# Patient Record
Sex: MALE | Race: WHITE | Employment: OTHER | URBAN - METROPOLITAN AREA
[De-identification: names, ages, dates, MRNs, and addresses within clinical notes are randomized per-mention and may not be internally consistent; named-entity substitution may affect disease eponyms.]

---

## 2020-08-12 ENCOUNTER — HOSPITAL ENCOUNTER (EMERGENCY)
Age: 80
Discharge: HOME | End: 2020-08-12
Attending: EMERGENCY MEDICINE
Payer: MEDICARE

## 2020-08-12 ENCOUNTER — APPOINTMENT (OUTPATIENT)
Dept: GENERAL RADIOLOGY | Age: 80
End: 2020-08-12
Payer: MEDICARE

## 2020-08-12 VITALS
DIASTOLIC BLOOD PRESSURE: 77 MMHG | TEMPERATURE: 98.2 F | WEIGHT: 187 LBS | HEIGHT: 70 IN | BODY MASS INDEX: 26.77 KG/M2 | OXYGEN SATURATION: 95 % | HEART RATE: 58 BPM | SYSTOLIC BLOOD PRESSURE: 165 MMHG | RESPIRATION RATE: 18 BRPM

## 2020-08-12 LAB
ANION GAP SERPL CALCULATED.3IONS-SCNC: 13 MMOL/L (ref 9–17)
BNP INTERPRETATION: ABNORMAL
BUN BLDV-MCNC: 14 MG/DL (ref 8–23)
BUN/CREAT BLD: ABNORMAL (ref 9–20)
CALCIUM SERPL-MCNC: 9.6 MG/DL (ref 8.6–10.4)
CHLORIDE BLD-SCNC: 100 MMOL/L (ref 98–107)
CO2: 23 MMOL/L (ref 20–31)
CREAT SERPL-MCNC: 0.85 MG/DL (ref 0.7–1.2)
GFR AFRICAN AMERICAN: >60 ML/MIN
GFR NON-AFRICAN AMERICAN: >60 ML/MIN
GFR SERPL CREATININE-BSD FRML MDRD: ABNORMAL ML/MIN/{1.73_M2}
GFR SERPL CREATININE-BSD FRML MDRD: ABNORMAL ML/MIN/{1.73_M2}
GLUCOSE BLD-MCNC: 147 MG/DL (ref 70–99)
HCT VFR BLD CALC: 32.2 % (ref 40.7–50.3)
HEMOGLOBIN: 9.7 G/DL (ref 13–17)
MAGNESIUM: 2.1 MG/DL (ref 1.6–2.6)
MCH RBC QN AUTO: 26.6 PG (ref 25.2–33.5)
MCHC RBC AUTO-ENTMCNC: 30.1 G/DL (ref 28.4–34.8)
MCV RBC AUTO: 88.5 FL (ref 82.6–102.9)
NRBC AUTOMATED: 0 PER 100 WBC
PDW BLD-RTO: 17.7 % (ref 11.8–14.4)
PLATELET # BLD: 209 K/UL (ref 138–453)
PMV BLD AUTO: 10.8 FL (ref 8.1–13.5)
POTASSIUM SERPL-SCNC: 4 MMOL/L (ref 3.7–5.3)
PRO-BNP: 3044 PG/ML
RBC # BLD: 3.64 M/UL (ref 4.21–5.77)
SARS-COV-2, PCR: NORMAL
SARS-COV-2, RAPID: NOT DETECTED
SARS-COV-2: NORMAL
SODIUM BLD-SCNC: 136 MMOL/L (ref 135–144)
SOURCE: NORMAL
TROPONIN INTERP: ABNORMAL
TROPONIN INTERP: ABNORMAL
TROPONIN T: ABNORMAL NG/ML
TROPONIN T: ABNORMAL NG/ML
TROPONIN, HIGH SENSITIVITY: 30 NG/L (ref 0–22)
TROPONIN, HIGH SENSITIVITY: 31 NG/L (ref 0–22)
WBC # BLD: 6.3 K/UL (ref 3.5–11.3)

## 2020-08-12 PROCEDURE — 85027 COMPLETE CBC AUTOMATED: CPT

## 2020-08-12 PROCEDURE — 83880 ASSAY OF NATRIURETIC PEPTIDE: CPT

## 2020-08-12 PROCEDURE — 93005 ELECTROCARDIOGRAM TRACING: CPT | Performed by: EMERGENCY MEDICINE

## 2020-08-12 PROCEDURE — 80048 BASIC METABOLIC PNL TOTAL CA: CPT

## 2020-08-12 PROCEDURE — 84484 ASSAY OF TROPONIN QUANT: CPT

## 2020-08-12 PROCEDURE — U0002 COVID-19 LAB TEST NON-CDC: HCPCS

## 2020-08-12 PROCEDURE — 71045 X-RAY EXAM CHEST 1 VIEW: CPT

## 2020-08-12 PROCEDURE — 99284 EMERGENCY DEPT VISIT MOD MDM: CPT

## 2020-08-12 PROCEDURE — 83735 ASSAY OF MAGNESIUM: CPT

## 2020-08-12 ASSESSMENT — ENCOUNTER SYMPTOMS
RHINORRHEA: 0
CONSTIPATION: 0
DIARRHEA: 0
WHEEZING: 0
SORE THROAT: 0
COUGH: 0
VOMITING: 0
ABDOMINAL DISTENTION: 0
NAUSEA: 0
SHORTNESS OF BREATH: 0

## 2020-08-12 NOTE — ED PROVIDER NOTES
[facility]  Emergency Department  Emergency Medicine Resident Sign-out     Care of Brianna Cochran was assumed from Dr. Sarika Lay and is being seen for Loss of Consciousness (syncopal episode at home, assited to ground, out for 20 seconds per family, )  . The patient's initial evaluation and plan have been discussed with the prior provider who initially evaluated the patient. EMERGENCY DEPARTMENT COURSE / MEDICAL DECISION MAKING:       MEDICATIONS GIVEN:  No orders of the defined types were placed in this encounter. LABS / RADIOLOGY:     Results for orders placed or performed during the hospital encounter of 08/12/20   CBC   Result Value Ref Range    WBC 6.3 3.5 - 11.3 k/uL    RBC 3.64 (L) 4.21 - 5.77 m/uL    Hemoglobin 9.7 (L) 13.0 - 17.0 g/dL    Hematocrit 32.2 (L) 40.7 - 50.3 %    MCV 88.5 82.6 - 102.9 fL    MCH 26.6 25.2 - 33.5 pg    MCHC 30.1 28.4 - 34.8 g/dL    RDW 17.7 (H) 11.8 - 14.4 %    Platelets 891 572 - 387 k/uL    MPV 10.8 8.1 - 13.5 fL    NRBC Automated 0.0 0.0 per 100 WBC   BASIC METABOLIC PANEL   Result Value Ref Range    Glucose 147 (H) 70 - 99 mg/dL    BUN 14 8 - 23 mg/dL    CREATININE 0.85 0.70 - 1.20 mg/dL    Bun/Cre Ratio NOT REPORTED 9 - 20    Calcium 9.6 8.6 - 10.4 mg/dL    Sodium 136 135 - 144 mmol/L    Potassium 4.0 3.7 - 5.3 mmol/L    Chloride 100 98 - 107 mmol/L    CO2 23 20 - 31 mmol/L    Anion Gap 13 9 - 17 mmol/L    GFR Non-African American >60 >60 mL/min    GFR African American >60 >60 mL/min    GFR Comment          GFR Staging NOT REPORTED    Troponin   Result Value Ref Range    Troponin, High Sensitivity 30 (H) 0 - 22 ng/L    Troponin T NOT REPORTED <0.03 ng/mL    Troponin Interp NOT REPORTED    MAGNESIUM   Result Value Ref Range    Magnesium 2.1 1.6 - 2.6 mg/dL   COVID-19    Specimen: Other   Result Value Ref Range    SARS-CoV-2          SARS-CoV-2, Rapid Not Detected Not Detected    Source . NASOPHARYNGEAL SWAB     SARS-CoV-2, PCR         Troponin

## 2020-08-12 NOTE — ED NOTES
Pt came into ER in [facility], pt reports syncopal episode today at home and per family he was assisted to the ground, family reports this lasted 20 seconds, pt reports he felt weak while playing gold yesterday out in the sun, pt denies chest pain, pt denies any pain, pt on monitor, in NAD, will continue to assess      Sae Holden RN  08/12/20 [id number]

## 2020-08-12 NOTE — PROGRESS NOTES
RAPID Covid 19 swab taken from right nare, labeled, placed in red dot bag, and handed off to second healthcare worker outside of room for transport to laboratory per hospital policy and procedure. Patient tolerated procedure well.       Swab placed at coordinators desk for transport to Lab

## 2020-08-12 NOTE — ED PROVIDER NOTES
[facility] ED  Emergency Department        Pt Name: Dasha Tellez  MRN: [id number]  [place] 1940  Date of evaluation: 8/12/20    CHIEF COMPLAINT       Chief Complaint   Patient presents with    Loss of Consciousness     syncopal episode at home, assited to ground, out for 20 seconds per family,        HISTORY OF PRESENT ILLNESS  (Location/Symptom, Timing/Onset, Context/Setting, Quality, Duration, ModifyingFactors, Severity.)      Dasha Tellez is a 78 y.o. male who presents with syncopal episode while at work. Patient was lowered to the ground he did feel dizzy prior to the onset of passing out. No shaking episode no loss of bowel or bladder incontinence. He does have significant history the cardiac disease including CABG in March 2019 he has had multiple cardioversions from a history of A. fib but has not had A. fib since his CABG. He follows with cardiology at [facility]. He is not on any anticoagulation as he reports he is \"genetically sensitive\" he denies any cough or congestion. Does have chronic shortness of breath which is unchanged. No recent fevers no leg swelling no abdominal pain no nausea or vomiting. He did get diaphoretic and was pale per witnesses who assisted him to the ground. No postical state. Lu Pelayo feels he is just dehydrated, after being in sun yesterday    Patient also reports a h/o of pleural effusions, requiring thoracentesis, know recurrence of effusions, Follows with pulm at [facility]. Just had imaging a few days ago, and has follow up with CT surgery next week. PAST MEDICAL / SURGICAL / SOCIAL / FAMILY HISTORY      has a past medical history of A-fib ([address] History of cardioversion, Hypertension, and Rectus sheath hematoma. has no past surgical history on file.        Social History     Socioeconomic History    Marital status:      Spouse name: Not on file    Number of children: Not on file    Years of education: Not on file    Highest education level: Not on file   Occupational History    Not on file   Social Needs    Financial resource strain: Not on file    Food insecurity     Worry: Not on file     Inability: Not on file    Transportation needs     Medical: Not on file     Non-medical: Not on file   Tobacco Use    Smoking status: Never Smoker    Smokeless tobacco: Never Used   Substance and Sexual Activity    Alcohol use: Yes     Comment: social    Drug use: No    Sexual activity: Not on file   Lifestyle    Physical activity     Days per week: Not on file     Minutes per session: Not on file    Stress: Not on file   Relationships    Social connections     Talks on phone: Not on file     Gets together: Not on file     Attends Latter-day service: Not on file     Active member of club or organization: Not on file     Attends meetings of clubs or organizations: Not on file     Relationship status: Not on file    Intimate partner violence     Fear of current or ex partner: Not on file     Emotionally abused: Not on file     Physically abused: Not on file     Forced sexual activity: Not on file   Other Topics Concern    Not on file   Social History Narrative    Not on file       History reviewed. No pertinent family history. Allergies:  Coumadin [warfarin sodium]; Effient [prasugrel]; Lovenox [enoxaparin sodium]; and Plavix [clopidogrel bisulfate]    Home Medications:  Prior to Admission medications    Medication Sig Start Date End Date Taking?  Authorizing Provider   oxyCODONE-acetaminophen (PERCOCET) 5-325 MG per tablet Take 1-2 tablets by mouth every 4 hours as needed for Pain 4/14/16   [place] MD   indomethacin (INDOCIN) 50 MG capsule Take 1 capsule by mouth 3 times daily (with meals) 4/14/16   [place] MD   allopurinol (ZYLOPRIM) 100 MG tablet Take 200 mg by mouth daily    Historical Provider, MD   ibuprofen (ADVIL;MOTRIN) 800 MG tablet Take 1 tablet by mouth every 8 hours as needed Physical Exam  Vitals signs and nursing note reviewed. Constitutional:       General: He is not in acute distress. Appearance: He is well-developed. Comments: Non toxic appearing, speaking in full sentences without signs of distress   HENT:      Head: Normocephalic and atraumatic. Eyes:      General:         Right eye: No discharge. Left eye: No discharge. Conjunctiva/sclera: Conjunctivae normal.   Cardiovascular:      Rate and Rhythm: Normal rate and regular rhythm. Heart sounds: Normal heart sounds. No murmur. No friction rub. No gallop. Pulmonary:      Effort: Pulmonary effort is normal. No respiratory distress. Breath sounds: Normal breath sounds. No wheezing or rales. Chest:      Chest wall: No tenderness. Abdominal:      General: There is no distension. Palpations: Abdomen is soft. There is no mass. Tenderness: There is no abdominal tenderness. There is no guarding or rebound. Musculoskeletal:      Right lower leg: No edema. Left lower leg: No edema. Skin:     General: Skin is warm and dry. Findings: No rash. Neurological:      Mental Status: He is alert and oriented to person, place, and time. DIFFERENTIAL  DIAGNOSIS     Patient with history of CABG, pleural effusions. Felt dizzy today had a syncopal episode. No prodromal headache no prodromal chest pain or shortness of breath. Patient does have a history of A. fib he is not anticoagulated. EKG shows bradycardia with frequent PVCs unclear if this was cardiac event versus what patient feels is dehydration. Patient does not appear overtly dehydrated. He has wet mucous membranes.   We will plan on electrolytes cardiac work-up with BNP as well as chest x-ray troponins x2 EKG we will keep patient on telemetry    PLAN (LABS / IMAGING / EKG):  Orders Placed This Encounter   Procedures    XR CHEST PORTABLE    CBC    BASIC METABOLIC PANEL    Troponin    MAGNESIUM    Urinalysis with microscopic    COVID-19    Troponin    Brain Natriuretic Peptide    Telemetry monitoring    EKG 12 Lead    Insert peripheral IV       MEDICATIONS ORDERED:  No orders of the defined types were placed in this encounter. DIAGNOSTIC RESULTS / EMERGENCY DEPARTMENT COURSE / MDM     LABS:  Results for orders placed or performed during the hospital encounter of 08/12/20   CBC   Result Value Ref Range    WBC 6.3 3.5 - 11.3 k/uL    RBC 3.64 (L) 4.21 - 5.77 m/uL    Hemoglobin 9.7 (L) 13.0 - 17.0 g/dL    Hematocrit 32.2 (L) 40.7 - 50.3 %    MCV 88.5 82.6 - 102.9 fL    MCH 26.6 25.2 - 33.5 pg    MCHC 30.1 28.4 - 34.8 g/dL    RDW 17.7 (H) 11.8 - 14.4 %    Platelets 904 281 - 641 k/uL    MPV 10.8 8.1 - 13.5 fL    NRBC Automated 0.0 0.0 per 100 WBC   BASIC METABOLIC PANEL   Result Value Ref Range    Glucose 147 (H) 70 - 99 mg/dL    BUN 14 8 - 23 mg/dL    CREATININE 0.85 0.70 - 1.20 mg/dL    Bun/Cre Ratio NOT REPORTED 9 - 20    Calcium 9.6 8.6 - 10.4 mg/dL    Sodium 136 135 - 144 mmol/L    Potassium 4.0 3.7 - 5.3 mmol/L    Chloride 100 98 - 107 mmol/L    CO2 23 20 - 31 mmol/L    Anion Gap 13 9 - 17 mmol/L    GFR Non-African American >60 >60 mL/min    GFR African American >60 >60 mL/min    GFR Comment          GFR Staging NOT REPORTED    Troponin   Result Value Ref Range    Troponin, High Sensitivity 30 (H) 0 - 22 ng/L    Troponin T NOT REPORTED <0.03 ng/mL    Troponin Interp NOT REPORTED    MAGNESIUM   Result Value Ref Range    Magnesium 2.1 1.6 - 2.6 mg/dL   COVID-19    Specimen: Other   Result Value Ref Range    SARS-CoV-2          SARS-CoV-2, Rapid Not Detected Not Detected    Source . NASOPHARYNGEAL SWAB     SARS-CoV-2, PCR         Troponin   Result Value Ref Range    Troponin, High Sensitivity 31 (H) 0 - 22 ng/L    Troponin T NOT REPORTED <0.03 ng/mL    Troponin Interp NOT REPORTED        IMPRESSION: Syncope, bradycardia    RADIOLOGY:  XR CHEST PORTABLE   Final Result   Bilateral pleural effusions with bibasilar pulmonary opacities that could   represent atelectasis or pneumonia               EKG:  Patient shows EKG that shows sinus bradycardia with frequent PVCs. No ST elevations or depressions noted. EMERGENCY DEPARTMENT COURSE:  1:11 PM EDT  Spoke with patient and wife, due to concern for cardiac etiology causing syncope. And recommended admission, given patient h/o of all medical follow up at [address] [place] [address] did offer transfer v admission at [facility], patient and wife discussed and would like to be transferred. 2:46 PM EDT  Initially spoke with Dr. Fiona Ramirez at [facility] [employer] [facility] who was going to speak with cardiology before accepting the patient. FINAL IMPRESSION      1. Syncope and collapse    2. Dizziness          DISPOSITION / PLAN     DISPOSITION Decision To Transfer 08/12/2020 01:11:44 PM      PATIENT REFERRED TO:  No follow-up provider specified.     DISCHARGE MEDICATIONS:  New Prescriptions    No medications on file       [place]  1:12 PM    Attending Emergency Physician  [address] [facility]    (Please note that portions of this note were completed with a voice recognition program.  Shalonda Li made to edit the dictations but occasionally words are mis-transcribed.)              Ulysses Hernandez DO  08/12/20 1447

## 2020-08-12 NOTE — ED NOTES
Writer sent blood work to lab   Pt resting in bed in NAD will continue to assess      Lico Tucker RN  08/12/20 7314

## 2020-08-12 NOTE — ED PROVIDER NOTES
[address] HANDOFF       Handoff taken on the following patient from prior Attending Physician:  Pt Name: Elisha Lisa  PCP:  Irina Bonner MD    Attestation  I was available and discussed any additional care issues that arose and coordinated the management plans with the resident(s) caring for the patient during my duty period. Any areas of disagreement with resident's documentation of care or procedures are noted on the chart. I was personally present for the key portions of any/all procedures during my duty period. I have documented in the chart those procedures where I was not present during the key portions. CHIEF COMPLAINT       Chief Complaint   Patient presents with    Loss of Consciousness     syncopal episode at home, assited to ground, out for 20 seconds per family,          CURRENT MEDICATIONS     Previous Medications  Discharge Medication List as of 8/12/2020  4:48 PM      CONTINUE these medications which have NOT CHANGED    Details   oxyCODONE-acetaminophen (PERCOCET) 5-325 MG per tablet Take 1-2 tablets by mouth every 4 hours as needed for Pain, Disp-30 tablet, R-0      !! indomethacin (INDOCIN) 50 MG capsule Take 1 capsule by mouth 3 times daily (with meals), Disp-21 capsule, R-0      allopurinol (ZYLOPRIM) 100 MG tablet Take 200 mg by mouth daily      ibuprofen (ADVIL;MOTRIN) 800 MG tablet Take 1 tablet by mouth every 8 hours as needed for Pain, Disp-20 tablet, R-0      acetaminophen-codeine (TYLENOL/CODEINE #3) 300-30 MG per tablet Take 1 tablet by mouth every 6 hours as needed for Pain, Disp-20 tablet, R-0      !! indomethacin (INDOCIN) 50 MG capsule Take 1 capsule by mouth 2 times daily (with meals). , Disp-14 capsule, R-0      dofetilide (TIKOSYN) 125 MCG capsule Take 1 capsule by mouth every 12 hours. , Disp-60 capsule, R-0      losartan (COZAAR) 50 MG tablet Take 1 tablet by mouth daily. , Disp-30 tablet, R-0      metoprolol (LOPRESSOR) 50 MG tablet Take 1 tablet by mouth 2 times daily. , Disp-60 tablet, R-0      amLODIPine (NORVASC) 5 MG tablet Take 1 tablet by mouth daily. , Disp-30 tablet, R-0      furosemide (LASIX) 20 MG tablet Take 1 tablet by mouth 2 times daily. , Disp-60 tablet, R-0      Multiple Vitamins-Minerals (THERAPEUTIC MULTIVITAMIN-MINERALS) tablet Take 1 tablet by mouth daily. , Disp-30 tablet, R-2       !! - Potential duplicate medications found. Please discuss with provider. Encounter Medications  No orders of the defined types were placed in this encounter. ALLERGIES     is allergic to coumadin [warfarin sodium]; effient [prasugrel]; lovenox [enoxaparin sodium]; and plavix [clopidogrel bisulfate]. RECENT VITALS:   Temp: 98.2 °F (36.8 °C),  Pulse: 58, Resp: 18, BP: (!) 165/77    RADIOLOGY:   XR CHEST PORTABLE   Final Result   Bilateral pleural effusions with bibasilar pulmonary opacities that could   represent atelectasis or pneumonia             LABS:  Labs Reviewed   CBC - Abnormal; Notable for the following components:       Result Value    RBC 3.64 (*)     Hemoglobin 9.7 (*)     Hematocrit 32.2 (*)     RDW 17.7 (*)     All other components within normal limits   BASIC METABOLIC PANEL - Abnormal; Notable for the following components:    Glucose 147 (*)     All other components within normal limits   TROPONIN - Abnormal; Notable for the following components:    Troponin, High Sensitivity 30 (*)     All other components within normal limits   TROPONIN - Abnormal; Notable for the following components:    Troponin, High Sensitivity 31 (*)     All other components within normal limits   BRAIN NATRIURETIC PEPTIDE - Abnormal; Notable for the following components:    Pro-BNP 3,044 (*)     All other components within normal limits   MAGNESIUM   COVID-19   URINALYSIS WITH MICROSCOPIC       Patient had syncope with preceding symptoms. Prior atrial fibrillation ablation. On betablocker. Has bradycardia 50-55 with occasional PVCs.  Elevated P-BNP, slight anemia (unchanged), minimal TnI. PLAN/ TASKS OUTSTANDING     Awaiting call back from his cardiologist at [facility]. [facility] physician suggested discharge. Patient initially agreeable to admission to monitor for HR, heart block etc and consult cardiology. Later, after no return call from his cardiologist, they decided to leave against our initial plan. (Please note that portions of this note were completed with a voice recognition program.  Efforts were made to edit the dictations but occasionally words are mis-transcribed. )    Garcia MD, F.A.C.E.P.   Attending Emergency Physician        Iron Mejia MD  08/12/20 Nolberto Agustin MD  08/12/20 3736

## 2020-08-12 NOTE — ED NOTES
Report taken from [facility]. Care assumed at this time.       Lina York RN  08/12/20 9839

## 2020-08-12 NOTE — ED NOTES
Family at bedside      Claire Argueta, [facility]  08/12/20 9803

## 2020-08-12 NOTE — ED NOTES
Pt resting in bed in NAD   Family at bedside   Will continue to assess      Felipa Martínez RN  08/12/20 5004

## 2020-08-12 NOTE — ED NOTES
Pt resting in bed in NAD   Family at bedside   Writer sent 2nd trop to lab via tube system   Will continue to assess      Trevor Tatum RN  08/12/20 5081

## 2020-08-13 LAB
EKG ATRIAL RATE: 227 BPM
EKG Q-T INTERVAL: 484 MS
EKG QRS DURATION: 90 MS
EKG QTC CALCULATION (BAZETT): 463 MS
EKG R AXIS: -24 DEGREES
EKG T AXIS: -7 DEGREES
EKG VENTRICULAR RATE: 55 BPM

## 2020-08-13 PROCEDURE — 93010 ELECTROCARDIOGRAM REPORT: CPT | Performed by: INTERNAL MEDICINE

## 2024-07-23 ENCOUNTER — HOSPITAL ENCOUNTER (OUTPATIENT)
Dept: PHYSICAL THERAPY | Age: 84
Setting detail: THERAPIES SERIES
Discharge: HOME | End: 2024-07-23
Payer: MEDICARE

## 2024-07-23 PROCEDURE — 97161 PT EVAL LOW COMPLEX 20 MIN: CPT

## 2024-07-23 NOTE — CONSULTS
[] [facility]   Outpatient Rehabilitation & Therapy  [address]  P: [phone]   F: [phone] [x] [facility]  Outpatient Rehabilitation &  Therapy  [address]  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 Pt is agreeable to wheelchair evaluation. Rehab Medical [facility] for evaluation.    Patient has a history of multiple medical problems that impair mobility.  Breathing and SOB are leading the patient to wanting a power mobility device.    Falls History: No falls.     Per Note from PMR:   2024 - Ros Holden MD (Pulmonology):  CC:    HPI:Derek Baker is a 83 year old male here for follow appointment for pleural effusion and pulmonary nodules      Derek Baker (MRN [id number]) is a 83 year old male,  with a history of CABG x 3, MVR, TVr, MAZE, LAAL on 3/29/2019, PAF, CAD, Right renal cancer (survillence), obesity, BRITTANY (not on CPAP), GI bleed fall 2019 presents for evaluation of pleural effusion.     Let pleural effusion present since CABG. Developed rihgt pleural effusion after hospitalization for GI Bleed in 2019. Had hospitalization for fluid overload in late .       We sent him for a thoracentesis in 2020.  Chemistries were cancelled because of lab error, 650 cc were able to be drained from the right side.  CT immediately after thoracentesis demonstrated rounded atelectasis and residual right effusion (not completely drainable).  Also showed some pulmonary nodules which where